# Patient Record
Sex: FEMALE | Race: WHITE | URBAN - METROPOLITAN AREA
[De-identification: names, ages, dates, MRNs, and addresses within clinical notes are randomized per-mention and may not be internally consistent; named-entity substitution may affect disease eponyms.]

---
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
## 2018-01-23 ENCOUNTER — OFFICE VISIT - HEALTHEAST (OUTPATIENT)
Dept: PEDIATRICS | Facility: CLINIC | Age: 18
End: 2018-01-23

## 2018-01-23 DIAGNOSIS — Z00.00 ROUTINE ADULT HEALTH MAINTENANCE: ICD-10-CM

## 2018-01-23 DIAGNOSIS — Z11.3 SCREENING EXAMINATION FOR SEXUALLY TRANSMITTED DISEASE: ICD-10-CM

## 2018-01-23 DIAGNOSIS — N83.209 OVARIAN CYST: ICD-10-CM

## 2018-01-23 LAB — HIV 1+2 AB+HIV1 P24 AG SERPL QL IA: NEGATIVE

## 2018-01-23 ASSESSMENT — MIFFLIN-ST. JEOR: SCORE: 1413.36

## 2018-01-24 LAB
C TRACH DNA SPEC QL PROBE+SIG AMP: NEGATIVE
HBV SURFACE AG SERPL QL IA: NEGATIVE
HCV AB SERPL QL IA: NEGATIVE
N GONORRHOEA DNA SPEC QL NAA+PROBE: NEGATIVE
SYPHILIS RPR SCREEN - HISTORICAL: NORMAL

## 2018-01-29 ENCOUNTER — COMMUNICATION - HEALTHEAST (OUTPATIENT)
Dept: PEDIATRICS | Facility: CLINIC | Age: 18
End: 2018-01-29

## 2018-10-13 ENCOUNTER — OFFICE VISIT - HEALTHEAST (OUTPATIENT)
Dept: FAMILY MEDICINE | Facility: CLINIC | Age: 18
End: 2018-10-13

## 2018-10-13 DIAGNOSIS — R07.0 THROAT PAIN: ICD-10-CM

## 2018-10-13 DIAGNOSIS — J11.1 INFLUENZA-LIKE ILLNESS: ICD-10-CM

## 2018-10-13 LAB
DEPRECATED S PYO AG THROAT QL EIA: NORMAL
FLUAV AG SPEC QL IA: NORMAL
FLUBV AG SPEC QL IA: NORMAL

## 2018-10-14 LAB — GROUP A STREP BY PCR: NORMAL

## 2020-08-03 ENCOUNTER — COMMUNICATION - HEALTHEAST (OUTPATIENT)
Dept: FAMILY MEDICINE | Facility: CLINIC | Age: 20
End: 2020-08-03

## 2020-08-04 ENCOUNTER — OFFICE VISIT - HEALTHEAST (OUTPATIENT)
Dept: FAMILY MEDICINE | Facility: CLINIC | Age: 20
End: 2020-08-04

## 2020-08-04 DIAGNOSIS — Z00.00 ROUTINE GENERAL MEDICAL EXAMINATION AT A HEALTH CARE FACILITY: ICD-10-CM

## 2020-08-04 DIAGNOSIS — Z86.69 HISTORY OF MIGRAINE HEADACHES: ICD-10-CM

## 2020-08-04 DIAGNOSIS — Z23 NEED FOR VACCINATION: ICD-10-CM

## 2020-08-04 DIAGNOSIS — Z12.4 ENCOUNTER FOR SCREENING FOR CERVICAL CANCER: ICD-10-CM

## 2020-08-04 ASSESSMENT — MIFFLIN-ST. JEOR: SCORE: 1445.91

## 2020-08-05 LAB
BKR LAB AP ABNORMAL BLEEDING: NO
BKR LAB AP BIRTH CONTROL/HORMONES: NORMAL
BKR LAB AP CERVICAL APPEARANCE: NORMAL
BKR LAB AP GYN ADEQUACY: NORMAL
BKR LAB AP GYN INTERPRETATION: NORMAL
BKR LAB AP HPV REFLEX: NORMAL
BKR LAB AP LMP: NORMAL
BKR LAB AP PATIENT STATUS: NORMAL
BKR LAB AP PREVIOUS ABNORMAL: NORMAL
BKR LAB AP PREVIOUS NORMAL: NORMAL
C TRACH DNA SPEC QL PROBE+SIG AMP: NEGATIVE
HIGH RISK?: NO
N GONORRHOEA DNA SPEC QL NAA+PROBE: NEGATIVE
PATH REPORT.COMMENTS IMP SPEC: NORMAL
RESULT FLAG (HE HISTORICAL CONVERSION): NORMAL

## 2020-08-10 ENCOUNTER — COMMUNICATION - HEALTHEAST (OUTPATIENT)
Dept: SCHEDULING | Facility: CLINIC | Age: 20
End: 2020-08-10

## 2020-10-26 ENCOUNTER — VIRTUAL VISIT (OUTPATIENT)
Dept: FAMILY MEDICINE | Facility: OTHER | Age: 20
End: 2020-10-26

## 2020-10-26 DIAGNOSIS — Z20.822 SUSPECTED COVID-19 VIRUS INFECTION: Primary | ICD-10-CM

## 2020-10-26 NOTE — PROGRESS NOTES
"Date: 10/26/2020 17:58:08  Clinician: Shine Roach  Clinician NPI: [id number]  Patient: Ruth Yuen  Patient : 2000  Patient Address: [facility] [address] [place] [id number]  Patient Phone: [phone]  Visit Protocol: URI  Patient Summary:  Ruth is a 20 year old ( : 2000 ) female who initiated a [facility] Visit for cold, sinus infection, or influenza. When asked the question \"Please sign me up to receive news, health information and promotions. \", Ruth responded \"No\".    Ruth states her symptoms started 1-2 days ago.   Her symptoms consist of nasal congestion, myalgia, and malaise. She is experiencing difficulty breathing due to nasal congestion but she is not short of breath. Ruth also feels feverish.   Symptom details     Nasal secretions: The color of her mucus is clear.    Temperature: Her current temperature is 99.2 degrees Fahrenheit.      Ruth denies having ear pain, headache, wheezing, cough, anosmia, vomiting, rhinitis, nausea, facial pain or pressure, chills, sore throat, teeth pain, ageusia, and diarrhea. She also denies taking antibiotic medication in the past month and having recent facial or sinus surgery in the past 60 days.   Precipitating events  She has not recently been exposed to someone with influenza. Ruth has not been in close contact with any high risk individuals.   Pertinent COVID-19 (Coronavirus) information  In the past 14 days, Ruth has not worked in a congregate living setting.   She does not work or volunteer as healthcare worker or a  and does not work or volunteer in a healthcare facility.   Ruth also has not lived in a congregate living setting in the past 14 days. She does not live with a healthcare worker.   Ruth has not had a close contact with a laboratory-confirmed COVID-19 patient within 14 days of symptom onset.   Since 2019, Ruth and has had upper respiratory infection (URI) or influenza-like " illness. Has not been diagnosed with lab-confirmed COVID-19 test      Date(s) of previous URI or influenza-like illness (free-text): 3 years ago     Symptoms Ruth experienced during previous URI or influenza-like illness as reported by the patient (free-text): Fever, aches, stuffy nose, fatigue        Pertinent medical history  Ruth does not get yeast infections when she takes antibiotics.   Ruth does not need a return to work/school note.   Weight: 150 lbs   Ruth does not smoke or use smokeless tobacco.   She denies pregnancy and denies breastfeeding. She has menstruated in the past month.   Weight: 150 lbs    MEDICATIONS: No current medications, ALLERGIES: amoxicillin  Clinician Response:  Dear Ruth,   Your symptoms show that you may have coronavirus (COVID-19). This illness can cause fever, cough and trouble breathing. Many people get a mild case and get better on their own. Some people can get very sick.  What should I do?  We would like to test you for this virus.   1. Please call [phone] to schedule your visit. Explain that you were referred by [facility] to have a COVID-19 test. Be [facility].  Please note that if you are assessed for Covid-19 testing and receive an order for testing from [facility], that the scheduling of your Covid test at [facility] may be delayed by three or four days or more due to limited availability for testing. Additional options for testing can be found on the [place] Covid-19 Response website. https://[place].gov/covid19/    The following will serve as your written order for this COVID Test, ordered by me, for the indication of suspected COVID [Z20.828]: The test will be ordered in [facility], our electronic health record, after you are scheduled. It will show as ordered and authorized by Reggie Jimenez MD.  Order: COVID-19 (Coronavirus) PCR for SYMPTOMATIC testing from [facility].   2. When it's time for your COVID test:  Stay at least 6 feet away from  "others. (If someone will drive you to your test, stay in the backseat, as far away from the  as you can.)   Cover your mouth and nose with a mask, tissue or washcloth.  Go straight to the testing site. Don't make any stops on the way there or back.      3.Starting now: Stay home and away from others (self-isolate) until:   You've had no fever---and no medicine that reduces fever---for one full day (24 hours). And...   Your other symptoms have gotten better. For example, your cough or breathing has improved. And...   At least 10 days have passed since your symptoms started.       During this time, don't leave the house except for testing or medical care.   Stay in your own room, even for meals. Use your own bathroom if you can.   Stay away from others in your home. No hugging, kissing or shaking hands. No visitors.  Don't go to work, school or anywhere else.    Clean \"high touch\" surfaces often (doorknobs, counters, handles, etc.). Use a household cleaning spray or wipes. You'll find a full list of  on the EPA website: www.epa.gov/pesticide-registration/list-n-disinfectants-use-against-sars-cov-2.   Cover your mouth and nose with a mask, tissue or washcloth to avoid spreading germs.  Wash your hands and face often. Use soap and water.  Caregivers in these groups are at risk for severe illness due to COVID-19:  o People 65 years and older  o People who live in a nursing home or long-term care facility  o People with chronic disease (lung, heart, cancer, diabetes, kidney, liver, immunologic)  o People who have a weakened immune system, including those who:   Are in cancer treatment  Take medicine that weakens the immune system, such as corticosteroids  Had a bone marrow or organ transplant  Have an immune deficiency  Have poorly controlled HIV or AIDS  Are obese (body mass index of 40 or higher)  Smoke regularly   o Caregivers should wear gloves while washing dishes, handling laundry and cleaning bedrooms " and bathrooms.  o Use caution when washing and drying laundry: Don't shake dirty laundry, and use the warmest water setting that you can.  o For more tips, go to www.cdc.gov/coronavirus/2019-ncov/downloads/10Things.pdf.    4.Sign up for [employer]. We know it's scary to hear that you might have COVID-19. We want to track your symptoms to make sure you're okay over the next 2 weeks. Please look for an email from [employer]---this is a free, online program that we'll use to keep in touch. To sign up, follow the link in the email. Learn more at [email or web address]  How can I take care of myself?   Get lots of rest. Drink extra fluids (unless a doctor has told you not to).   Take Tylenol (acetaminophen) for fever or pain. If you have liver or kidney problems, ask your family doctor if it's okay to take Tylenol.   Adults can take either:    650 mg (two 325 mg pills) every 4 to 6 hours, or...   1,000 mg (two 500 mg pills) every 8 hours as needed.    Note: Don't take more than 3,000 mg in one day. Acetaminophen is found in many medicines (both prescribed and over-the-counter medicines). Read all labels to be sure you don't take too much.   For children, check the Tylenol bottle for the right dose. The dose is based on the child's age or weight.    If you have other health problems (like cancer, heart failure, an organ transplant or severe kidney disease): Call your specialty clinic if you don't feel better in the next 2 days.       Know when to call 911. Emergency warning signs include:    Trouble breathing or shortness of breath Pain or pressure in the chest that doesn't go away Feeling confused like you haven't felt before, or not being able to wake up Bluish-colored lips or face.  Where can I get more information?    [facility] -- About COVID-19: [email or web address]   CDC -- What to Do If You're Sick: www.cdc.gov/coronavirus/2019-ncov/about/steps-when-sick.html   CDC -- Ending Home  Isolation: www.cdc.gov/coronavirus/2019-ncov/hcp/disposition-in-home-patients.html   CDC -- Caring for Someone: www.cdc.gov/coronavirus/2019-ncov/if-you-are-sick/care-for-someone.html   [facility] -- Interim Guidance for Hospital Discharge to Home: www.[facility].[place]./diseases/coronavirus/hcp/hospdischarge.pdf   [facility] clinical trials (COVID-19 research studies): clinicalaffairs.[facility]/n-clinical-trials    Below are the COVID-19 hotlines at the [place] Department of Health ([facility]). Interpreters are available.    For health questions: Call 001-755-2673 or 1-399.759.9283 (7 a.m. to 7 p.m.) For questions about schools and childcare: Call 871-538-3374 or 1-150.385.7832 (7 a.m. to 7 p.m.)    Diagnosis: Contact with and (suspected) exposure to other viral communicable diseases  Diagnosis ICD: Z20.828

## 2020-10-27 DIAGNOSIS — Z20.822 SUSPECTED COVID-19 VIRUS INFECTION: ICD-10-CM

## 2020-10-27 PROCEDURE — U0003 INFECTIOUS AGENT DETECTION BY NUCLEIC ACID (DNA OR RNA); SEVERE ACUTE RESPIRATORY SYNDROME CORONAVIRUS 2 (SARS-COV-2) (CORONAVIRUS DISEASE [COVID-19]), AMPLIFIED PROBE TECHNIQUE, MAKING USE OF HIGH THROUGHPUT TECHNOLOGIES AS DESCRIBED BY CMS-2020-01-R: HCPCS | Performed by: PHYSICIAN ASSISTANT

## 2020-10-28 ENCOUNTER — TELEPHONE (OUTPATIENT)
Dept: NURSING | Facility: CLINIC | Age: 20
End: 2020-10-28

## 2020-10-28 ENCOUNTER — NURSE TRIAGE (OUTPATIENT)
Dept: NURSING | Facility: CLINIC | Age: 20
End: 2020-10-28

## 2020-10-28 LAB
SARS-COV-2 RNA SPEC QL NAA+PROBE: ABNORMAL
SPECIMEN SOURCE: ABNORMAL

## 2020-10-28 NOTE — TELEPHONE ENCOUNTER
Coronavirus (COVID-19) Notification    Reason for call  Notify of POSITIVE  COVID-19 lab result, assess symptoms,  review [facility] recommendations    Lab Result   Lab test for 2019-nCoV rRt-PCR or SARS-COV-2 PCR  Oropharyngeal AND/OR nasopharyngeal swabs were POSITIVE for 2019-nCoV RNA [OR] SARS-COV-2 RNA (COVID-19) RNA     We have been unable to reach Patient by phone at this time to notify of their Positive COVID-19 result.  Left voicemail message requesting a call back to [phone] [facility] for results.        POSITIVE COVID-19 Letter sent.    [Name]  Eileen James RN/[facility], 10/28/20, 3:58 PM.

## 2020-10-28 NOTE — TELEPHONE ENCOUNTER
"Coronavirus (COVID-19) Notification    Caller Name (Patient, parent, daughter/son, grandparent, etc)  Patient      Reason for call  Notify of Positive Coronavirus (COVID-19) lab results, assess symptoms,  review [facility] recommendations    Lab Result    Lab test:  2019-nCoV rRt-PCR or SARS-CoV-2 PCR    Oropharyngeal AND/OR nasopharyngeal swabs is POSITIVE for 2019-nCoV RNA/SARS-COV-2 PCR (COVID-19 virus)    RN Recommendations/Instructions per [facility] Coronavirus COVID-19 recommendations    Brief introduction script  Introduce self then review script:  \"I am calling on behalf of [employer].  We were notified that your Coronavirus test (COVID-19) for was POSITIVE for the virus.  I have some information to relay to you but first I wanted to mention that the [place] Dept of Health will be contacting you shortly [it's possible MD already called Patient] to talk to you more about how you are feeling and other people you have had contact with who might now also have the virus.  Also, [facility] is Partnering with the [facility] for Covid-19 research, you may be contacted directly by research staff.\"    Assessment (Inquire about Patient's current symptoms)   Assessment   Current Symptoms at time of phone call: (if no symptoms, document No symptoms] Body a ches , general malaise, headache   Symptoms onset (if applicable) 10/25     If at time of call, Patients symptoms hare worsened, the Patient should contact 911 or have someone drive them to Emergency Dept promptly:      If Patient calling 911, inform 911 personal that you have tested positive for the Coronavirus (COVID-19).  Place mask on and await 911 to arrive.    If Emergency Dept, If possible, please have another adult drive you to the Emergency Dept but you need to wear mask when in contact with other people.      Review information with Patient    Your result was positive. This means you have COVID-19 (coronavirus).  We have sent you " a letter that reviews the information that I'll be reviewing with you now.    How can I protect others?    If you have symptoms: stay home and away from others (self-isolate) until:    You've had no fever--and no medicine that reduces fever--for 1 full day (24 hours). And       Your other symptoms have gotten better. For example, your cough or breathing has improved. And     At least 10 days have passed since your symptoms started. (If you've been told by a doctor that you have a weak immune system, wait 20 days.)     If you don't have symptoms: Stay home and away from others (self-isolate) until at least 10 days have passed since your first positive COVID-19 test. (Date test collected)    During this time:    Stay in your own room, including for meals. Use your own bathroom if you can.    Stay away from others in your home. No hugging, kissing or shaking hands. No visitors.     Don't go to work, school or anywhere else.     Clean  high touch  surfaces often (doorknobs, counters, handles, etc.). Use a household cleaning spray or wipes. You'll find a full list on the EPA website at www.epa.gov/pesticide-registration/list-n-disinfectants-use-against-sars-cov-2.     Cover your mouth and nose with a mask, tissue or other face covering to avoid spreading germs.    Wash your hands and face often with soap and water.    Caregivers in these groups are at risk for severe illness due to COVID-19:  o People 65 years and older  o People who live in a nursing home or long-term care facility  o People with chronic disease (lung, heart, cancer, diabetes, kidney, liver, immunologic)  o People who have a weakened immune system, including those who:  - Are in cancer treatment  - Take medicine that weakens the immune system, such as corticosteroids  - Had a bone marrow or organ transplant  - Have an immune deficiency  - Have poorly controlled HIV or AIDS  - Are obese (body mass index of 40 or higher)  - Smoke regularly    Caregivers  should wear gloves while washing dishes, handling laundry and cleaning bedrooms and bathrooms.    Wash and dry laundry with special caution. Don't shake dirty laundry, and use the warmest water setting you can.    If you have a weakened immune system, ask your doctor about other actions you should take.    For more tips, go to www.cdc.gov/coronavirus/2019-ncov/downloads/10Things.pdf.    You should not go back to work until you meet the guidelines above for ending your home isolation. You don't need to be retested for COVID-19 before going back to work--studies show that you won't spread the virus if it's been at least 10 days since your symptoms started (or 20 days, if you have a weak immune system).    Employers: This document serves as formal notice of your employee's medical guidelines for going back to work. They must meet the above guidelines before going back to work in person.    How can I take care of myself?    1. Get lots of rest. Drink extra fluids (unless a doctor has told you not to).    2. Take Tylenol (acetaminophen) for fever or pain. If you have liver or kidney problems, ask your family doctor if it's okay to take Tylenol.     Take either:     650 mg (two 325 mg pills) every 4 to 6 hours, or     1,000 mg (two 500 mg pills) every 8 hours as needed.     Note: Don't take more than 3,000 mg in one day. Acetaminophen is found in many medicines (both prescribed and over-the-counter medicines). Read all labels to be sure you don't take too much.    For children, check the Tylenol bottle for the right dose (based on their age or weight).    3. If you have other health problems (like cancer, heart failure, an organ transplant or severe kidney disease): Call your specialty clinic if you don't feel better in the next 2 days.    4. Know when to call 911: Emergency warning signs include:    Trouble breathing or shortness of breath    Pain or pressure in the chest that doesn't go away    Feeling confused like you  haven't felt before, or not being able to wake up    Bluish-colored lips or face    5. Sign up for [employer]. We know it's scary to hear that you have COVID-19. We want to track your symptoms to make sure you're okay over the next 2 weeks. Please look for an email from [employer]--this is a free, online program that we'll use to keep in touch. To sign up, follow the link in the email. Learn more at [email or web address].    Where can I get more information?    [facility]: [email or web address]    Coronavirus Basics: www.health.[facility].[place]./diseases/coronavirus/basics.html    What to Do If You're Sick: www.cdc.gov/coronavirus/2019-ncov/about/steps-when-sick.html    Ending Home Isolation: www.cdc.gov/coronavirus/2019-ncov/hcp/disposition-in-home-patients.html     Caring for Someone with COVID-19: www.cdc.gov/coronavirus/2019-ncov/if-you-are-sick/care-for-someone.html     [facility] clinical trials (COVID-19 research studies): clinicalaffairs.[facility]-clinical-trials     A Positive COVID-19 letter will be sent via [employer] or the mail. (Exception, no letters sent to Presurgerical/Preprocedure Patients)  Vilma Lugo RN  FNA

## 2020-10-29 ENCOUNTER — NURSE TRIAGE (OUTPATIENT)
Dept: NURSING | Facility: CLINIC | Age: 20
End: 2020-10-29

## 2020-10-29 NOTE — TELEPHONE ENCOUNTER
Patient calling for number or e-mail to fax a form to. She recently tested positive for covid and needs this release form sent for her employer.  During the conversation the call was dropped.  I tried reaching back out to the caller, no answer, left a generic VM with [facility](where testing was done) main number, fax number and also advised if this wasn't what she was requesting she could call her PCP(HE) in am 10/30.  Camille Whitmore RN [facility]

## 2020-11-03 ENCOUNTER — NURSE TRIAGE (OUTPATIENT)
Dept: NURSING | Facility: CLINIC | Age: 20
End: 2020-11-03

## 2020-11-03 NOTE — TELEPHONE ENCOUNTER
"Virtual Visit-Follow up needed for employer. Patient calling stating her employer needs to have her cleared for work. Stating she started with symptoms of COVID 19 on 10/25/20. Patient reporting symptoms have completely resolved. Patient had positive COVID 19 test on 10/27/20.   Patient provided employer note as noted below Employers: This document serves as formal notice of your employee's medical guidelines for going back to work. They must meet the above guidelines before going back to work in person.    Patient stating employer went not accept this documentation and she needs to be cleared for work.      Coronavirus (COVID-19) Notification    Caller Name (Patient, parent, daughter/son, grandparent, etc)  Patient calling requesting lab results.     Reason for call  Notify of Positive Coronavirus (COVID-19) lab results, assess symptoms,  review [facility] recommendations    Lab Result    Lab test:  2019-nCoV rRt-PCR or SARS-CoV-2 PCR    Oropharyngeal AND/OR nasopharyngeal swabs is POSITIVE for 2019-nCoV RNA/SARS-COV-2 PCR (COVID-19 virus)    RN Recommendations/Instructions per  [facility] Coronavirus COVID-19 recommendations    Brief introduction script  Introduce self then review script:  \"I am calling on behalf of [employer].  We were notified that your Coronavirus test (COVID-19) for was POSITIVE for the virus.  I have some information to relay to you but first I wanted to mention that the [place] Dept of Health will be contacting you shortly [it's possible MD already called Patient] to talk to you more about how you are feeling and other people you have had contact with who might now also have the virus.  Also, [employer] is Partnering with the [facility] for Covid-19 research, you may be contacted directly by research staff.\"    Assessment (Inquire about Patient's current symptoms)   Assessment   Current Symptoms at time of phone call: (if no symptoms, document No symptoms] None "   Symptoms onset (if applicable) 10/25/20     If at time of call, Patients symptoms hare worsened, the Patient should contact 911 or have someone drive them to Emergency Dept promptly:      If Patient calling 911, inform 911 personal that you have tested positive for the Coronavirus (COVID-19).  Place mask on and await 911 to arrive.    If Emergency Dept, If possible, please have another adult drive you to the Emergency Dept but you need to wear mask when in contact with other people.      Review information with Patient    Your result was positive. This means you have COVID-19 (coronavirus).  We have sent you a letter that reviews the information that I'll be reviewing with you now.    How can I protect others?    If you have symptoms: stay home and away from others (self-isolate) until:    You've had no fever--and no medicine that reduces fever--for 1 full day (24 hours). And       Your other symptoms have gotten better. For example, your cough or breathing has improved. And     At least 10 days have passed since your symptoms started. (If you've been told by a doctor that you have a weak immune system, wait 20 days.)     If you don't have symptoms: Stay home and away from others (self-isolate) until at least 10 days have passed since your first positive COVID-19 test. (Date test collected)    During this time:    Stay in your own room, including for meals. Use your own bathroom if you can.    Stay away from others in your home. No hugging, kissing or shaking hands. No visitors.     Don't go to work, school or anywhere else.     Clean  high touch  surfaces often (doorknobs, counters, handles, etc.). Use a household cleaning spray or wipes. You'll find a full list on the EPA website at www.epa.gov/pesticide-registration/list-n-disinfectants-use-against-sars-cov-2.     Cover your mouth and nose with a mask, tissue or other face covering to avoid spreading germs.    Wash your hands and face often with soap and  water.    Caregivers in these groups are at risk for severe illness due to COVID-19:  o People 65 years and older  o People who live in a nursing home or long-term care facility  o People with chronic disease (lung, heart, cancer, diabetes, kidney, liver, immunologic)  o People who have a weakened immune system, including those who:  - Are in cancer treatment  - Take medicine that weakens the immune system, such as corticosteroids  - Had a bone marrow or organ transplant  - Have an immune deficiency  - Have poorly controlled HIV or AIDS  - Are obese (body mass index of 40 or higher)  - Smoke regularly    Caregivers should wear gloves while washing dishes, handling laundry and cleaning bedrooms and bathrooms.    Wash and dry laundry with special caution. Don't shake dirty laundry, and use the warmest water setting you can.    If you have a weakened immune system, ask your doctor about other actions you should take.    For more tips, go to www.cdc.gov/coronavirus/2019-ncov/downloads/10Things.pdf.    You should not go back to work until you meet the guidelines above for ending your home isolation. You don't need to be retested for COVID-19 before going back to work--studies show that you won't spread the virus if it's been at least 10 days since your symptoms started (or 20 days, if you have a weak immune system).    Employers: This document serves as formal notice of your employee's medical guidelines for going back to work. They must meet the above guidelines before going back to work in person.    How can I take care of myself?    1. Get lots of rest. Drink extra fluids (unless a doctor has told you not to).    2. Take Tylenol (acetaminophen) for fever or pain. If you have liver or kidney problems, ask your family doctor if it's okay to take Tylenol.     Take either:     650 mg (two 325 mg pills) every 4 to 6 hours, or     1,000 mg (two 500 mg pills) every 8 hours as needed.     Note: Don't take more than 3,000 mg  in one day. Acetaminophen is found in many medicines (both prescribed and over-the-counter medicines). Read all labels to be sure you don't take too much.    For children, check the Tylenol bottle for the right dose (based on their age or weight).    3. If you have other health problems (like cancer, heart failure, an organ transplant or severe kidney disease): Call your specialty clinic if you don't feel better in the next 2 days.    4. Know when to call 911: Emergency warning signs include:    Trouble breathing or shortness of breath    Pain or pressure in the chest that doesn't go away    Feeling confused like you haven't felt before, or not being able to wake up    Bluish-colored lips or face    5. Sign up for [employer]. We know it's scary to hear that you have COVID-19. We want to track your symptoms to make sure you're okay over the next 2 weeks. Please look for an email from [employer]--this is a free, online program that we'll use to keep in touch. To sign up, follow the link in the email. Learn more at [email or web address].    Where can I get more information?    [facility]: [email or web address]    Coronavirus Basics: www.health.[facility].[place]/diseases/coronavirus/basics.html    What to Do If You're Sick: www.cdc.gov/coronavirus/2019-ncov/about/steps-when-sick.html    Ending Home Isolation: www.cdc.gov/coronavirus/2019-ncov/hcp/disposition-in-home-patients.html     Caring for Someone with COVID-19: www.cdc.gov/coronavirus/2019-ncov/if-you-are-sick/care-for-someone.html     [facility] clinical trials (COVID-19 research studies): clinicalaffairs.[facility]/n-clinical-trials     A Positive COVID-19 letter will be sent via [employer] or the mail. (Exception, no letters sent to Presurgerical/Preprocedure Patients)    [Name]  Jennifer Carreno RN  [facility]         Reason for Disposition    [1] Caller requesting NON-URGENT health information AND [2] PCP's office is  the best resource    Additional Information    Negative: [1] Caller is not with the adult (patient) AND [2] reporting urgent symptoms    Negative: Lab result questions    Negative: Medication questions    Negative: Caller can't be reached by phone    Negative: Caller has already spoken to PCP or another triager    Negative: RN needs further essential information from caller in order to complete triage    Negative: Requesting regular office appointment    Protocols used: INFORMATION ONLY CALL-A-AH

## 2021-01-04 ENCOUNTER — HEALTH MAINTENANCE LETTER (OUTPATIENT)
Age: 21
End: 2021-01-04

## 2021-05-28 ASSESSMENT — ASTHMA QUESTIONNAIRES: ACT_TOTALSCORE: 25

## 2021-05-30 ENCOUNTER — RECORDS - HEALTHEAST (OUTPATIENT)
Dept: ADMINISTRATIVE | Facility: CLINIC | Age: 21
End: 2021-05-30

## 2021-05-30 VITALS — WEIGHT: 133 LBS | BODY MASS INDEX: 21.8 KG/M2

## 2021-05-30 VITALS — WEIGHT: 136.3 LBS | BODY MASS INDEX: 22.34 KG/M2

## 2021-05-31 ENCOUNTER — RECORDS - HEALTHEAST (OUTPATIENT)
Dept: ADMINISTRATIVE | Facility: CLINIC | Age: 21
End: 2021-05-31

## 2021-05-31 VITALS — HEIGHT: 66 IN | BODY MASS INDEX: 23.01 KG/M2 | WEIGHT: 143.2 LBS

## 2021-05-31 VITALS — WEIGHT: 134.9 LBS | BODY MASS INDEX: 22.11 KG/M2

## 2021-06-02 VITALS — BODY MASS INDEX: 24.02 KG/M2 | WEIGHT: 146.6 LBS

## 2021-06-04 VITALS
HEIGHT: 66 IN | TEMPERATURE: 98.5 F | OXYGEN SATURATION: 98 % | RESPIRATION RATE: 18 BRPM | DIASTOLIC BLOOD PRESSURE: 68 MMHG | SYSTOLIC BLOOD PRESSURE: 102 MMHG | WEIGHT: 149.5 LBS | HEART RATE: 79 BPM | BODY MASS INDEX: 24.03 KG/M2

## 2021-06-09 NOTE — PROGRESS NOTES
Subjective:      Ruth Yuen is a 17 y.o. female here with mom for evaluation of right ear pain x 2 weeks. Pain is an intense pressure, occasional sharp pain in the ear and jaw. Pain is otherwise pretty constant. Helps to lay on the right side, able to sleep better, but still waking a few times during the night. No fevers, body aches or chills, afebrile in clinic. Tried Aleve, Tylenol, Benadryl, not much relief. Application of ice packs does help. Pain radiates down her neck and back behind the head. Pain in the back half of the lower jaw. Painful to chew on the right side, pain with opening and closing the mouth. Noticed some swollen lymph nodes on the right side. No pain with swallowing, feels like there is something stuck in her throat, able to clear secretions and eat and drinking normally.  Does have muffled hearing on the right side as well. No recent oral surgery or dental procedures or issues. No recent trauma or injury to the head/face/neck.    Was seen by PCP back on 2/23/17, RST and strep confirmatory both negative. Patient says pain hasn't necessarily gotten worse, although its enough to start causing her new onset of headaches. She does have hx of Migraines, but says this is different. Headache is just over the right temporal region for the past 2 days. Pain is a combination of throbbing and stabbing sensation.     Per mom patient has a high pain tolerance, hx of soft palate abscess after dental surgery. Was thought that abscess was present for quite awhile until finally noticed swelling in the roof of her mouth, patient otherwise did not c/o pain. So for her to complain so much now, is concerning to mom.      Objective:     Vitals:    02/27/17 1819   BP: 116/70   Pulse: 70   Resp: 14   Temp: 98.1  F (36.7  C)   SpO2: 100%       General: Alert, interactive, NAD, cooperative on exam  Eyes: PERRLA, EOMI, conjunctivae clear.   Ears: Right TM; pink and translucent. Pain with pressure to the tragus.  No mastoid pain with palpation, no redness or swelling. Outer ear and ear canal both appear normal. Left TM; pink and translucent   Nose:  Nasal mucosa mild erythema and inflammation. Clear mucus . No maxillary or frontal sinus tenderness  Mouth/Throat:  Tonsillar hypertrophy, 1+, erythematous, no exudate. Uvula midline with palatal petechiae. Posterior pharynx erythematous.  Mucus membranes pink and moist, free of lesions.  Neck: Supple, symmetrical, no adenopathy, tenderness with palpation. Does have moderate discomfort on the right side with side to side manipulation of the trachea and larynx. Trachea otherwise midline. No obvious redness or swelling.  Lungs: CTA bilaterally, good air movement throughout. No rales, rhonchi or wheezing  Heart:: Regular rate and rhythm, S1, S2 normal, no murmur, click, rub or gallop    Results for orders placed or performed in visit on 02/27/17   Mononucleosis Screen   Result Value Ref Range    Mono Screen Negative Negative   HM1 (CBC with Diff)   Result Value Ref Range    WBC 5.6 4.0 - 11.0 thou/uL    RBC 4.22 3.80 - 5.40 mill/uL    Hemoglobin 13.0 12.0 - 16.0 g/dL    Hematocrit 38.1 35.0 - 47.0 %    MCV 90 80 - 100 fL    MCH 30.8 27.0 - 34.0 pg    MCHC 34.2 32.0 - 36.0 g/dL    RDW 12.3 11.0 - 14.5 %    Platelets 259 140 - 440 thou/uL    MPV 6.4 (L) 7.0 - 10.0 fL    Neutrophils % 45 (L) 50 - 70 %    Lymphocytes % 42 (H) 20 - 40 %    Monocytes % 10 2 - 10 %    Eosinophils % 3 0 - 6 %    Basophils % 1 0 - 2 %    Neutrophils Absolute 2.5 2.0 - 7.7 thou/uL    Lymphocytes Absolute 2.4 0.8 - 4.4 thou/uL    Monocytes Absolute 0.6 0.0 - 0.9 thou/uL    Eosinophils Absolute 0.2 0.0 - 0.4 thou/uL    Basophils Absolute 0.0 0.0 - 0.2 thou/uL     Ct Soft Tissue Neck With Contrast    Result Date: 2/27/2017  [facility] CT SOFT TISSUE NECK W CONTRAST 2/27/2017 7:49 PM INDICATION: Throat pain right air pain with a possible retropharyngeal abscess. TECHNIQUE: CT neck performed with IV  contrast. Axial imaging with coronal and sagittal reconstruction.  Dose reduction techniques were used. IV CONTRAST: 100 cc of Omni 350. COMPARISON: None. FINDINGS:  There is mild prominence of the tonsillar pillars but there is no discrete abscess or fluid collection noted. The mucosal surfaces of the upper aerodigestive tract are symmetrical and unremarkable without a discrete mass. [The parapharyngeal spaces and the  spaces are symmetric and unremarkable. The oral cavity and floor of mouth structures are symmetrical and unremarkable. The parotid and submandibular glands are symmetrical and unremarkable. No small bilateral cervical lymph nodes noted. None are pathologic by size or morphologic criteria. The largest is a right jugulodigastric lymph node that measures approximately 1.17 mm x 0.1 cm. The left jugular vein is dominant. The thyroid gland is grossly unremarkable. The included orbital and intracranial compartments are unremarkable. The visualized portions of the paranasal sinuses are clear. The mastoid air cells and middle ear cavities are clear. The cervical spine is unremarkable. The included upper thorax is unremarkable.     CONCLUSION: 1.  Mild prominence of the tonsillar pillars but there is no discrete abscess formation. 2.  No evidence of airway compromise. 3.  Mild reactive adenopathy. 4.  Findings were communicated by phone to Dr. Oconnor on 2/27/2017 at 8:18 pm.         Assessment/Plan:      1. Tonsillitis  - azithromycin (ZITHROMAX Z-VELMA) 250 MG tablet; Take 2 tablets (500 mg) on  Day 1,  followed by 1 tablet (250 mg) once daily on Days 2 through 5.  Dispense: 6 tablet; Refill: 0  - prednisone (DELTASONE) 20 MG tablet; Take 2 tablets (40 mg total) by mouth daily for 5 days.  Dispense: 10 tablet; Refill: 0    2. Throat pain  - Culture, Throat  - HM1(CBC and Differential)  - Sedimentation Rate  - C-Reactive Protein  - Mononucleosis Screen  - HM1 (CBC with Diff)  - CT Soft Tissue Neck  With Contrast     I reviewed exam, lab and CT findings with patient and parent. ESR and CRP still pending, as is the throat culture. Will contact parents when all results are in. No signs of peritonsillar or retropharyngeal abscess, no compromise of her airway. Patient is not in acute distress, she is swallowing secretions and tolerating fluids well, is hydrated. Opted to treat with Zithromax given duration of illness. Prednisone also prescribed, both of which were discussed with the patient. Should antibiotics need to be altered upon receiving throat culture results, will do that when we call. Additional supportive cares recommended; Warm salt water gargles. Hot tea/water with honey. Humidified air. May use tylenol or ibuprofen for discomfort.  Suck on hard candies/throat lozenges.  Push fluids and get lots of rest. If symptoms not improved over course of next 3-5 days, f/u with PCP, sooner if worsening.    -Patient instructions given.

## 2021-06-09 NOTE — PROGRESS NOTES
ASSESSMENT & PLAN:  1. Pharyngitis    - Rapid Strep A Screen-Throat  - Group A Strep, RNA Direct Detection, Throat        Patient Instructions   Schedule dentist appointment.    Schedule a physical exam.     Quick strep test is negative  Sore throat  is most likely from a viral infection, should improve without any prescription medication  Treat symptoms if needed to help your child feel better  Encourage fluids  OK to use acetaminophen or ibuprofen as needed  We will call with overnight strep result.   Call if your child gets worse or has new symptoms or is not improving.       Orders Placed This Encounter   Procedures     Rapid Strep A Screen-Throat     Group A Strep, RNA Direct Detection, Throat         Return if symptoms worsen or fail to improve.    CHIEF COMPLAINT:  Chief Complaint   Patient presents with     Ear Pain     Rt side     Neck Pain     Rt side        HISTORY OF PRESENT ILLNESS:  Ruth is a 17 y.o. female presenting to the clinic today for ear and neck pain. A week ago she noticed pain in her right ear and a sore throat on the right side. Five days ago her jaw started to hurt and has been in pain ever since. She recently noticed a bump on her neck underneath her jaw. It hurts to chew on the right side and she occasionally gets headaches, but only on the right side. She has some pain in her teeth. She denies any pain on the left side of her jaw, throat, or ear.    Migraines: Her migraines are controlled. She takes Zofran for nausea when she has migraines. She has a follow up with her doctor in a month.     REVIEW OF SYSTEMS:   Denies cough. Denies nasal congestion. No fever.   All other systems are negative.    PFSH:  She has not been to the dentist for over 6 months.     TOBACCO USE:  History   Smoking Status     Never Smoker   Smokeless Tobacco     Never Used       VITALS:  Vitals:    02/23/17 1004   Temp: 97.6  F (36.4  C)   TempSrc: Oral   Weight: 133 lb (60.3 kg)     Wt Readings from Last 3  Encounters:   02/23/17 133 lb (60.3 kg) (62 %, Z= 0.30)*   10/19/16 131 lb 6 oz (59.6 kg) (61 %, Z= 0.27)*   07/29/16 138 lb (62.6 kg) (71 %, Z= 0.57)*     * Growth percentiles are based on [facility] 2-20 Years data.     Body mass index is 21.8 kg/(m^2).    PHYSICAL EXAM:  Nursing note and vitals reviewed.  Constitutional:Patient appears alert, no acute distress. Cooperative with exam.  HEENT: Head: Normocephalic.   Right Ear: Tympanic membrane, external ear and canal normal.    Left Ear: Tympanic membrane, external ear and canal normal.    Nose: Nose normal.    Mouth/Throat: Mucous membranes are moist. Oropharynx is clear. Slight red tonsils. Right tonsil slightly enlarged. No trismus.    Eyes: Conjunctivae and lids are normal. Red reflex is present bilaterally. Pupils are equal, round, and reactive to light.    Neck: Neck supple. No lymphadenopathy. Small bump under mandible on left side.   Cardiovascular: Normal rate and regular rhythm. No murmur heard.  Pulmonary/Chest: Effort normal and breath sounds normal. There is normal air entry.   Abdominal: Soft. Bowel sounds are normal. There is no hepatosplenomegaly.  Neurological: Patient has normal reflexes.  Skin: No rashes are seen.       ADDITIONAL HISTORY SUMMARIZED (2): None.  DECISION TO OBTAIN EXTRA INFORMATION (1): None.   RADIOLOGY TESTS (1): None.  LABS (1): Labs ordered.   MEDICINE TESTS (1): None.  INDEPENDENT REVIEW (2 each): None.       The visit lasted a total of 7 minutes face to face with the patient. Over 50% of the time was spent counseling and educating the patient about ear and neck pain.    Arlene GIFFORD, am scribing for and in the presence of, Dr. Baig.    IDr. Baig personally performed the services described in this documentation, as scribed by Arlene Celestin in my presence, and it is both accurate and complete.    MEDICATIONS:  Current Outpatient Prescriptions   Medication Sig Dispense Refill     ondansetron (ZOFRAN-ODT) 4 MG disintegrating  tablet DIS ONE T PO  Q 8 H PRF NAUSEA AND VOMITING  0     propranolol (INDERAL LA) 60 mg 24 hr capsule TK 1 C PO D  6     No current facility-administered medications for this visit.        Total data points: 1

## 2021-06-10 NOTE — TELEPHONE ENCOUNTER
1st attempt to contact patient      Left message to call back for: Ruth    Information to relay to patient:  LMTCB    Patient has appt with Dr. Aragon on Tuesday    Please confirm appt date, time, location, mask and guest policy    Please complete travel screen (located in travel history on appt desk or in the purple shaded area on this encounter)    Please update appt note    Please close this encounter when done    Thank you!

## 2021-06-14 NOTE — PROGRESS NOTES
"Chief Complaint   Patient presents with     Sore Throat     \"really hard for me to swollow x yesterday     Fever     x last night 100F     Ear Pain     x both ears        HPI    Patient is here for one day of moderate sore throat, and bilateral ear pain. Mild nasal congestion. Associated symptoms included fever up to 101. No cough, shortness of breath.     ROS: Pertinent ROS noted in HPI.     Allergies   Allergen Reactions     Amoxicillin        Patient Active Problem List   Diagnosis     Esophageal Reflux     Mild Intermittent Asthma     Allergic Rhinitis     Pyogenic Granuloma     Headache     Sore throat       Family History   Problem Relation Age of Onset     Autoimmune disease Mother      Rheum arthritis Father      Leukemia Maternal Grandfather      thought due to agent orange       Social History     Social History     Marital status: Single     Spouse name: N/A     Number of children: N/A     Years of education: N/A     Occupational History     Not on file.     Social History Main Topics     Smoking status: Never Smoker     Smokeless tobacco: Never Used     Alcohol use Not on file     Drug use: Not on file     Sexual activity: Not on file     Other Topics Concern     Not on file     Social History Narrative    Patient presents to the ED accompanied by her mother.         Objective:    Vitals:    12/06/17 1050   BP: 100/60   Pulse: (!) 108   Resp: 16   Temp: 98.2  F (36.8  C)   SpO2: 99%       Gen:NAD  Oropharynx: mild erythema of tonsils without edema nor exudates, no evidence of abscess   Ears: R TM erythematous and bugling, L TM normal. Ear canals normal.   Nose: no discharge  Neck: enlarged and tender bilateral anterior cervical nodes  CV:RRR,no M, R, G  Pulm: CTAB, normal effort    Recent Results (from the past 24 hour(s))   Rapid Strep A Screen-Throat   Result Value Ref Range    Rapid Strep A Antigen Group A Strep detected (!) No Group A Strep detected, presumptive negative           Strep throat  -     " azithromycin (ZITHROMAX Z-VELMA) 250 MG tablet; Take 2 tablets (500 mg) on  Day 1,  followed by 1 tablet (250 mg) once daily on Days 2 through 5.    Acute right otitis media  -     azithromycin (ZITHROMAX Z-VELMA) 250 MG tablet; Take 2 tablets (500 mg) on  Day 1,  followed by 1 tablet (250 mg) once daily on Days 2 through 5.    Sore throat  -     Rapid Strep A Screen-Throat    Fever  -     Rapid Strep A Screen-Throat

## 2021-06-15 NOTE — PROGRESS NOTES
[facility] Well Child Check    ASSESSMENT & PLAN  Ruth Yuen is a 17 y.o. who has normal growth and normal development.    Diagnoses and all orders for this visit:    Routine adult health maintenance  -     Chlamydia trachomatis & Neisseria gonorrhoeae, Amplified Detection  -     HIV Antigen/Antibody Screening [facility]  -     Vision Screening    Screening examination for sexually transmitted disease  -     Hepatitis B Surface Antigen (HBsAG)  -     Hepatitis C Antibody (Anti-HCV)  -     Syphilis Screen, [facility]    Ovarian cyst - schedule appt with OB/GYN if you have recurrent pain due to cyst    Other orders  -     Discontinue: erythromycin with ethanol 2 % Swab; Applied to affected areas twice daily  Dispense: 60 each; Refill: 3  -     Influenza, Seasonal Quad, Preservative Free 36+ Months        Return to clinic in 1 year for a Well Child Check or sooner as needed    IMMUNIZATIONS/LABS  Immunizations were reviewed and orders were placed as appropriate. and I have discussed the risks and benefits of all of the vaccine components with the patient/parents.  All questions have been answered.    REFERRALS  Dental:  Recommend routine dental care as appropriate., The patient has already established care with a dentist.  Other:  No additional referrals were made at this time.    ANTICIPATORY GUIDANCE  I have reviewed age appropriate anticipatory guidance.  Social:  Friends, Peer Pressure and Changes and Choices  Parenting:  [place]/Dependence, Homework and Confidential Health Care  Nutrition:  Junk Food and Body Image  Play and Communication:  Appropriate Use of TV and Read Books  Health:  Drugs, Smoking, Alcohol, Activity (>45 min/day), Sleep, Sun Screen and Dental Care  Safety:  Seat Belts, Swimming Safety, Bike/Motorcycle Helmets and Outdoor Safety Avoiding Sun Exposure  Sexuality:  STD's and Contraception    HEALTH HISTORY  Do you have any concerns that you'd like to discuss today?: ER yesterday      Roomed  by: DORYS Guadarrama, CMA    Refills needed? No    Do you have any forms that need to be filled out? No        Do you have any significant health concerns in your family history?: No  Family History   Problem Relation Age of Onset     Autoimmune disease Mother      Melanoma Mother      Aneurysm Mother      splenic aneurysm, brain aneurysm     Rheum arthritis Father      Asthma Brother      Depression Brother      Anxiety disorder Brother      No Medical Problems Maternal Grandmother      Leukemia Maternal Grandfather      thought due to agent orange     No Medical Problems Paternal Grandmother      Stroke Paternal Grandfather      Since your last visit, have there been any major changes in your family, such as a move, job change, separation, divorce, or death in the family?: No  Has a lack of transportation kept you from medical appointments?: No    Home  Who lives in your home?:  Boyfriend  Social History     Social History Narrative    Patient presents to the ED accompanied by her mother.     Do you have any concerns about losing your housing?: No  Is your housing safe and comfortable?: Yes  Do you have any trouble with sleep?:  No    Education  What school do you child attend?:  Both, Working and going to school  What grade are you in?:  [employer]  How do you perform in school (grades, behavior, attention, homework?: going well     Eating  Do you eat regular meals including fruits and vegetables?:  no  What are you drinking (cow's milk, water, soda, juice, sports drinks, energy drinks, etc)?: cow's milk- 1% and water  Have you been worried that you don't have enough food?: No  Do you have concerns about your body or appearance?:  No    Activities  Do you have friends?:  yes  Do you get at least one hour of physical activity per day?:  no  How many hours a day are you in front of a screen other than for schoolwork (computer, TV, phone)?:  Not sure  What do you do for exercise?:  n/a  Do you have interest/participate in  "community activities/volunteers/school sports?:  yes    MENTAL HEALTH SCREENING  No Data Recorded  No Data Recorded    VISION/HEARING  Vision: Completed. See Results   Hearing:  Patient is already followed by a hearing specialist     Visual Acuity Screening    Right eye Left eye Both eyes   Without correction: 20/20 20/25 20/20   With correction:          TB Risk Assessment:  The patient and/or parent/guardian answer positive to:  patient and/or parent/guardian answer 'no' to all screening TB questions    Dyslipidemia Risk Screening  Have either of your parents or any of your grandparents had a stroke or heart attack before age 55?: No  Any parents with high cholesterol or currently taking medications to treat?: Yes: dad     Dental  When was the last time you saw the dentist?: 3-6 months ago   Flouride Varnish Application Screening  Is child seen by dentist?     Yes    Patient Active Problem List   Diagnosis     Esophageal Reflux     Mild Intermittent Asthma     Allergic Rhinitis     Pyogenic Granuloma     Headache       Drugs  Does the patient use tobacco/alcohol/drugs?:  no    Safety  Does the patient have any safety concerns (peer or home)?:  yes  Does the patient use safety belts, helmets and other safety equipment?:  yes    Sex  Have you ever had sex?:  Yes    MEASUREMENTS  Height:  5' 5.5\" (1.664 m)  Weight: 143 lb 3.2 oz (65 kg)  BMI: Body mass index is 23.47 kg/(m^2).  Blood Pressure: 122/70  Blood pressure percentiles are 89 % systolic and 74 % diastolic based on NHBPEP's 4th Report. Blood pressure percentile targets: 90: 123/77, 95: 127/81, 99 + 5 mmH/94.    PHYSICAL EXAM  Constitutional: She appears well-developed and well-nourished.   HEENT: Head: Normocephalic.    Right Ear: Tympanic membrane, external ear and canal normal.    Left Ear: Tympanic membrane, external ear and canal normal.    Nose: Nose normal.    Mouth/Throat: Mucous membranes are moist. Oropharynx is clear.    Eyes: Conjunctivae and " lids are normal. Pupils are equal, round, and reactive to light.   Neck: Neck supple. No tenderness is present.   Cardiovascular: Normal rate and regular rhythm. No murmur heard.  Pulmonary/Chest: Effort normal and breath sounds normal. There is normal air entry. Breast development is normal.  Gianni stage 5.   Abdominal: Soft. There is no hepatosplenomegaly. No inguinal hernia.   Musculoskeletal: Normal range of motion. Normal strength and tone. No abnormalities. Spine is straight. Normal duck walk.  Normal heel to toe walk.   : Normal external female genitalia.  Gianni stage 5.   Neurological: She is alert. She has normal reflexes. Gait normal.   Psychiatric: She has a normal mood and affect. Her speech is normal and behavior is normal.  Skin: Clear. No rashes.     ADDITIONAL HISTORY SUMMARIZED (2): None.  DECISION TO OBTAIN EXTRA INFORMATION (1): None.   RADIOLOGY TESTS (1): None.  LABS (1): Ordered labs.  MEDICINE TESTS (1): None.  INDEPENDENT REVIEW (2 each): None.     The visit lasted a total of 42 minutes face to face with the patient. Over 50% of the time was spent counseling and educating the patient about nutrition and development.    IReal, am scribing for and in the presence of, Dr. Baig.    I, Dr. Naty Baig, personally performed the services described in this documentation, as scribed by Real Kennedy in my presence, and it is both accurate and complete.    Total Data Points: 1

## 2021-06-20 NOTE — LETTER
Letter by Alicia Aragon MD at      Author: Alicia Aragon MD Service: -- Author Type: --    Filed:  Encounter Date: 8/4/2020 Status: (Other)         August 4, 2020     Patient: Ruth Yuen   YOB: 2000   Date of Visit: 8/4/2020       To Whom it May Concern:    Ruth Yuen was seen in my clinic on 8/4/2020. She was diagnosed with migraine headaches by neurology in 2016.  She had testing done including MRI, which was unremarkable, per patient.  It was thought migraine headaches were due to stress.  She learned techniques to decrease stress and since 2017, she has not had any headaches.  She had normal neurological exam today, and may re-enlist in [employer].      If you have any questions or concerns, please don't hesitate to call.    Sincerely,         Electronically signed by Alicia Aragon MD

## 2021-06-21 NOTE — PROGRESS NOTES
Assessment/Plan:   Throat pain  Influenza-like illness  Acute influenza like illness for 3 days including ST, URI, cough, fatigue, body aches and chills. RST and flu test negative. Still likely a viral illness and hopefully self limited. Reviewed sxs management. Follow up as needed.   - Rapid Strep A Screen-Throat  - Group A Strep, RNA Direct Detection, Throat  - Influenza A/B Rapid Test    Fluids, rest, steam, nasal saline  Cough drops  Tylenol or ibuprofen  Strep confirmation test pending - we will call only if positive  Follow up if worse or no better    Subjective:      Ruth Yuen is a 18 y.o. female who presents with ST, cough, URI.  3 days ago she developed ST. This was associated with HA, fatigue, myalgia, chills and feeling feverish as well as URI and cough. The ST has gotten worse. seh attends [employer] for college and everyone around her has been ill including roommates. No wheeze or shortness of breath. Some hoarseness. She wanted to make sure it wasn't strep since she was feeling so crummy. Has not had flu shot this year. No rash. No N/V/D. No vertigo. No urinary sxs. No history of asthma or needing an inhaler.     Allergies   Allergen Reactions     Amoxicillin Hives and Rash     Current Outpatient Prescriptions on File Prior to Visit   Medication Sig Dispense Refill     etonogestrel (NEXPLANON) 68 mg Impl implant 1 each by Subdermal route once.       calcium, as carbonate, (TUMS) 200 mg calcium (500 mg) chewable tablet Chew 1-2 tablets every 6 (six) hours as needed for heartburn.       erythromycin with ethanol (ERYDERM) 2 % external solution Apply to affected areas twice daily 60 mL 3     tretinoin (RETIN-A) 0.05 % cream Apply topically at bedtime. 45 g 0     No current facility-administered medications on file prior to visit.      Patient Active Problem List   Diagnosis     Esophageal Reflux     Mild Intermittent Asthma     Allergic Rhinitis     Pyogenic Granuloma     Headache       Objective:      /68  Pulse 98  Temp 98.8  F (37.1  C) (Oral)   Resp 18  Wt 146 lb 9.6 oz (66.5 kg)  SpO2 100%  BMI 24.02 kg/m2    Physical  General Appearance: Alert, cooperative, no distress, mildly ill appearing, AVSS.   Head: Normocephalic, without obvious abnormality, atraumatic  Eyes: Conjunctivae are normal.   Ears: Normal TMs and external ear canals, both ears  Nose;  congestion.  Throat: Throat is red.  No exudate.  No palatal petechiae or mucosal lesions  Neck: No adenopathy  Lungs: Clear to auscultation bilaterally, respirations unlabored.  Occasional cough  Heart: Regular rate and rhythm  Skin: Skin color, texture, turgor normal, no rashes or lesions  Psychiatric: Patient has a normal mood and affect.        Recent Results (from the past 48 hour(s))   Rapid Strep A Screen-Throat   Result Value Ref Range    Rapid Strep A Antigen No Group A Strep detected, presumptive negative No Group A Strep detected, presumptive negative   Group A Strep, RNA Direct Detection, Throat   Result Value Ref Range    Group A Strep by PCR No Group A Strep rRNA detected No Group A Strep rRNA detected   Influenza A/B Rapid Test   Result Value Ref Range    Influenza  A, Rapid Antigen No Influenza A antigen detected No Influenza A antigen detected    Influenza B, Rapid Antigen No Influenza B antigen detected No Influenza B antigen detected

## 2021-06-29 NOTE — PROGRESS NOTES
Progress Notes by Alicia Aragon MD at 8/4/2020  3:20 PM     Author: Alicia Aragon MD Service: -- Author Type: Physician    Filed: 8/4/2020  4:06 PM Encounter Date: 8/4/2020 Status: Signed    : Alicia Aragon MD (Physician)       FEMALE PREVENTATIVE EXAM    Assessment and Plan:       1. Routine general medical examination at a health care facility  Discussed annual gonorrhea chlamydia screen, ordered today, will inform patient of results.  - Chlamydia trachomatis & Neisseria gonorrhoeae, Amplified Detection    2. History of migraine headaches  Patient has not had migraine headache in several years.  Normal neurological exam today.  Letter was written, see chart, copy was given to patient.    3. Encounter for screening for cervical cancer   Counseled patient regarding Pap smear, will inform patient of results.  - Gynecologic Cytology (PAP Smear)    4. Need for vaccination  I have discussed the risks and benefits of all of the vaccine components with the patient.  All questions have been answered.  - Td, Preservative Free (green label)     Next follow up:  Return in about 1 year (around 8/4/2021) for Annual physical.    Immunization Review  Adult Imm Review: Due today, orders placed    I discussed the following with the patient:   Adult Healthy Living: Importance of regular exercise  Healthy nutrition    I have had an Advance Directives discussion with the patient.    Subjective:   Chief Complaint: Ruth Yuen is an 20 y.o. female, new to me, here for a preventative health visit.  Additional concerns:      HPI:  She saw a neurologist around 2016, at that time, was diagnosed with migraine headaches, and was medically discharged from [employer].  She would now like to re-enlist.  She had normal MRI and CT scan, all normal.  Thought it was likely due to stress and birth control pill.  She has since reduced stress significantly.  Since 2017, have not had any headaches.      Healthy Habits  Are  "you taking a daily aspirin? No  Do you typically exercising at least 40 min, 3-4 times per week?  Yes  Do you usually eat at least 4 servings of fruit and vegetables a day, include whole grains and fiber and avoid regularly eating high fat foods? Yes  Have you had an eye exam in the past two years? NO  Do you see a dentist twice per year? Yes  Do you have any concerns regarding sleep? No    Safety Screen  If you own firearms, are they secured in a locked gun cabinet or with trigger locks? Yes  Do you feel you are safe where you are living?: Yes (8/4/2020  3:14 PM)  Do you feel you are safe in your relationship(s)?: Yes (8/4/2020  3:14 PM)      Review of Systems:  Please see above.  The rest of the review of systems are negative for all systems.     Pap History:   No - age 21-29 PAP every 3 years recommended  Cancer Screening       Status Date      PAP SMEAR Overdue 2/19/2019           Patient Care Team:  Naty Baig MD as PCP - General  Naty Baig MD as Assigned PCP        History     Reviewed By Date/Time Sections Reviewed    Alicia Aragon MD 8/4/2020  3:40 PM Medical, Surgical, Family    Quita Garcia CMA 8/4/2020  3:15 PM Tobacco            Objective:   Vital Signs:   Visit Vitals  /68 (Patient Site: Right Arm, Patient Position: Sitting, Cuff Size: Adult Regular)   Pulse 79   Temp 98.5  F (36.9  C) (Tympanic)   Resp 18   Ht 5' 5.75\" (1.67 m)   Wt 149 lb 8 oz (67.8 kg)   SpO2 98%   BMI 24.31 kg/m           PHYSICAL EXAM  General Appearance: Alert, cooperative, no distress, appears stated age  Head: Normocephalic, without obvious abnormality, atraumatic  Eyes: PERRL, conjunctiva/corneas clear, EOM's intact  Ears: Normal TM's and external ear canals, both ears  Nose: Nares normal, septum midline,mucosa normal, no drainage  Throat: Lips, mucosa, and tongue normal; teeth and gums normal  Neck: Supple, symmetrical, trachea midline, no adenopathy;  thyroid: not enlarged, symmetric, no " tenderness/mass/nodules;   Back: Symmetric, no curvature, ROM normal, no CVA tenderness  Lungs: Clear to auscultation bilaterally, respirations unlabored  Breasts: No breast masses, tenderness, asymmetry, or nipple discharge.  Heart: Regular rate and rhythm, no murmur.   Abdomen: Soft, non-tender, bowel sounds active all four quadrants,  no masses, no organomegaly  Pelvic:Normally developed genitalia with no external lesions or eruptions. Vagina and cervix show no lesions, inflammation, discharge or tenderness. No cystocele, No rectocele. Uterus normal.  No adnexal mass or tenderness.  Extremities: Extremities normal, atraumatic, no cyanosis or edema  Skin: Skin color, texture, turgor normal, no rashes or lesions  Lymph nodes: Cervical, supraclavicular, and axillary nodes normal  Neurologic: Alert.  Normal finger-to-nose test bilaterally.  Normal rapid hand movements.  Normal heel-to-shin test bilaterally.  2+ DTRs patellar tendons bilaterally.  Cranial nerves II through XII intact.  Psych: Normal affect.  Does not appear anxious or depressed.               Medication List          Accurate as of August 4, 2020  4:04 PM. If you have any questions, ask your nurse or doctor.            CONTINUE taking these medications    calcium (as carbonate) 200 mg calcium (500 mg) chewable tablet  Also known as:  TUMS  INSTRUCTIONS:  Chew 1-2 tablets every 6 (six) hours as needed for heartburn.        Nexplanon 68 mg Impl implant  INSTRUCTIONS:  1 each by Subdermal route once.  Generic drug:  etonogestreL           STOP taking these medications    erythromycin with ethanoL 2 % external solution  Also known as:  ERYDERM  Stopped by:  Alicia Aragon MD     tretinoin 0.05 % cream  Also known as:  RETIN-A  Stopped by:  Alicia Aragon MD            Additional Screenings Completed Today:

## 2021-10-11 ENCOUNTER — HEALTH MAINTENANCE LETTER (OUTPATIENT)
Age: 21
End: 2021-10-11

## 2022-09-24 ENCOUNTER — HEALTH MAINTENANCE LETTER (OUTPATIENT)
Age: 22
End: 2022-09-24

## 2023-01-29 ENCOUNTER — HEALTH MAINTENANCE LETTER (OUTPATIENT)
Age: 23
End: 2023-01-29

## 2024-02-25 ENCOUNTER — HEALTH MAINTENANCE LETTER (OUTPATIENT)
Age: 24
End: 2024-02-25